# Patient Record
Sex: FEMALE | Race: BLACK OR AFRICAN AMERICAN | NOT HISPANIC OR LATINO | URBAN - METROPOLITAN AREA
[De-identification: names, ages, dates, MRNs, and addresses within clinical notes are randomized per-mention and may not be internally consistent; named-entity substitution may affect disease eponyms.]

---

## 2023-10-16 ENCOUNTER — EMERGENCY (EMERGENCY)
Facility: HOSPITAL | Age: 38
LOS: 1 days | Discharge: HOME | End: 2023-10-16
Attending: EMERGENCY MEDICINE
Payer: SELF-PAY

## 2023-10-16 VITALS
TEMPERATURE: 98 F | SYSTOLIC BLOOD PRESSURE: 110 MMHG | HEART RATE: 76 BPM | OXYGEN SATURATION: 98 % | DIASTOLIC BLOOD PRESSURE: 70 MMHG | RESPIRATION RATE: 16 BRPM | HEIGHT: 64 IN | WEIGHT: 139.99 LBS

## 2023-10-16 LAB
FLUAV AG NPH QL: SIGNIFICANT CHANGE UP
FLUBV AG NPH QL: SIGNIFICANT CHANGE UP
SARS-COV-2 RNA SPEC QL NAA+PROBE: SIGNIFICANT CHANGE UP

## 2023-10-16 PROCEDURE — 87637 SARSCOV2&INF A&B&RSV AMP PRB: CPT

## 2023-10-16 PROCEDURE — 99284 EMERGENCY DEPT VISIT MOD MDM: CPT

## 2023-10-16 PROCEDURE — 99285 EMERGENCY DEPT VISIT HI MDM: CPT

## 2023-10-16 RX ORDER — METHADONE HYDROCHLORIDE 40 MG/1
90 TABLET ORAL ONCE
Refills: 0 | Status: DISCONTINUED | OUTPATIENT
Start: 2023-10-16 | End: 2023-10-16

## 2023-10-16 RX ORDER — METHADONE HYDROCHLORIDE 40 MG/1
40 TABLET ORAL ONCE
Refills: 0 | Status: DISCONTINUED | OUTPATIENT
Start: 2023-10-16 | End: 2023-10-16

## 2023-10-16 RX ADMIN — METHADONE HYDROCHLORIDE 40 MILLIGRAM(S): 40 TABLET ORAL at 16:02

## 2023-10-16 NOTE — ED ADULT NURSE NOTE - CADM POA URETHRAL CATHETER
Assessment completed. No complaints of pain or discomfort. Encouraged to call if needs any thing. No

## 2023-10-16 NOTE — ED PROVIDER NOTE - OBJECTIVE STATEMENT
38-year-old female history of opiate abuse on methadone per patient here under police custody.  States she has had generalized body aches since yesterday.  States her last dose of methadone was 2 days ago.  States she obtains her methadone from [facility] in [place].  Denies any vomiting, fever, chest pain or shortness of breath.  Denies any allergies.

## 2023-10-16 NOTE — ED ADULT NURSE NOTE - NSFALLUNIVINTERV_ED_ALL_ED
Bed/Stretcher in lowest position, wheels locked, appropriate side rails in place/Call bell, personal items and telephone in reach/Instruct patient to call for assistance before getting out of bed/chair/stretcher/Non-slip footwear applied when patient is off stretcher/[place] to call system/Physically safe environment - no spills, clutter or unnecessary equipment/Purposeful proactive rounding/Room/bathroom lighting operational, light cord in reach

## 2023-10-16 NOTE — ED ADULT NURSE NOTE - ED STAT RN HANDOFF DETAILS
Patient observed sleeping most the time breathing unlabored D/C as per MD order,   Rosy #[id number] at bed side, awaiting for transportation.

## 2023-10-16 NOTE — ED PROVIDER NOTE - CLINICAL SUMMARY MEDICAL DECISION MAKING FREE TEXT BOX
38-year-old female states she is on methadone currently under police custody.  States she obtains her medication from [facility] in [place].  Upon calling facility.  No answer.  Facility is closed for listing on the Internet.  Will provide minimal dose of medication and anticipate discharge afterwards.

## 2023-10-16 NOTE — ED PROVIDER NOTE - PATIENT PORTAL LINK FT
You can access the FollowMyHealth Patient Portal offered by [facility] by registering at the following website: [email or web address]. By joining [employer]’s FollowMyHealth portal, you will also be able to view your health information using other applications (apps) compatible with our system.

## 2024-02-29 ENCOUNTER — EMERGENCY (EMERGENCY)
Facility: HOSPITAL | Age: 39
LOS: 1 days | Discharge: HOME | End: 2024-02-29
Attending: EMERGENCY MEDICINE | Admitting: EMERGENCY MEDICINE
Payer: SELF-PAY

## 2024-02-29 VITALS — HEART RATE: 64 BPM | OXYGEN SATURATION: 98 %

## 2024-02-29 VITALS
TEMPERATURE: 98 F | HEART RATE: 83 BPM | RESPIRATION RATE: 18 BRPM | OXYGEN SATURATION: 100 % | DIASTOLIC BLOOD PRESSURE: 92 MMHG | SYSTOLIC BLOOD PRESSURE: 141 MMHG

## 2024-02-29 PROCEDURE — 99053 MED SERV 10PM-8AM 24 HR FAC: CPT

## 2024-02-29 PROCEDURE — 99283 EMERGENCY DEPT VISIT LOW MDM: CPT

## 2024-02-29 NOTE — ED ADULT NURSE NOTE - CHIEF COMPLAINT QUOTE
Found wandering in [employer] shop. Endorses cocaine crack uses today. Denies alcohol use. Denies discomfort. in no apparent distress. No apparent injury noted. Hx of asthma opiate abuse    Pt undressed and placed in gown. Belonging across [facility]

## 2024-02-29 NOTE — ED PROVIDER NOTE - OBJECTIVE STATEMENT
38-year-old female history of polysubstance use presents to the emergency department after being found wandering at [employer].  patient admits to using heroin and smoking crack cocaine and taking an unknown pill.  Patient denies alcohol use.  Patient unsure of why or how she is in the emergency department.  Patient A&Ox3. No current complaints.

## 2024-02-29 NOTE — ED PROVIDER NOTE - NSFOLLOWUPINSTRUCTIONS_ED_ALL_ED_FT
Return to the ER for worsening or persistent symptoms, including but not limited to worsening/persistent pain, shortness of breath, fevers, vomiting, lightheadedness, passing out and/or ANY NEW OR CONCERNING SYMPTOMS. If you have issues obtaining follow up, please call: [phone] to obtain a doctor or specialist who takes your insurance in your area.  You may call [phone] to make an appointment with the internal medicine clinic.

## 2024-02-29 NOTE — ED ADULT NURSE NOTE - NSFALLRISKASMTTYPE_ED_ALL_ED
Quality 110: Preventive Care And Screening: Influenza Immunization: Influenza immunization was not ordered or administered, reason not given Detail Level: Detailed Quality 111:Pneumonia Vaccination Status For Older Adults: Pneumococcal vaccine (PPSV23) administered on or after patient’s 60th birthday and before the end of the measurement period Quality 431: Preventive Care And Screening: Unhealthy Alcohol Use - Screening: Patient not identified as an unhealthy alcohol user when screened for unhealthy alcohol use using a systematic screening method Initial (On Arrival) Quality 226: Preventive Care And Screening: Tobacco Use: Screening And Cessation Intervention: Patient screened for tobacco use and is an ex/non-smoker

## 2024-02-29 NOTE — ED ADULT TRIAGE NOTE - CHIEF COMPLAINT QUOTE
Found wandering in [employer] shop. Endorses cocaine crack uses today. Denies alcohol use. Denies discomfort. Denies discomfort at present. Hx of asthma opiate abuse Found wandering in [employer] shop. Endorses cocaine crack uses today. Denies alcohol use. Denies discomfort. in no apparent distress. Hx of asthma opiate abuse Found wandering in [employer] shop. Endorses cocaine crack uses today. Denies alcohol use. Denies discomfort. in no apparent distress. No apparent injury noted. Hx of asthma opiate abuse    Pt undressed and placed in gown. Belonging across [facility]

## 2024-02-29 NOTE — ED PROVIDER NOTE - CPE EDP EYES NORM
Chronic ongoing problem, discussed with patient about smoking cessation, counseled regarding this.  He is agreeable to start medication.  Prescribed varenicline generic prescription.  Discussed Chantix is off market now   normal...

## 2024-02-29 NOTE — ED PROVIDER NOTE - ATTENDING APP SHARED VISIT CONTRIBUTION OF CARE
HPI: 38-year-old female history of polysubstance use presents to the emergency department after being found wandering at [employer] [facility].  patient admits to using heroin and smoking crack cocaine and taking an unknown pill.  Patient denies alcohol use.  Patient unsure of why or how she is in the emergency department.  Patient A&Ox3. No current complaints.    EXAM: Awake, alert, NAD, heart RRR, lungs ctab, abd soft nontender, MSK exam normal     MDM: pt with polysubstance abuse that was brought in by EMS from [employer] for wandering. Has no complaints, VS WNL. Will monitor for sobriety and likely discharge home in AM. Pt FS wnl. Will place on pulse ox and if decompensates low threshold to obtain labs and provide narcan and monitor.

## 2024-02-29 NOTE — ED ADULT NURSE NOTE - CAS EDP DISCH TYPE
"Patient reports she has had intermittent abdominal pain since the beginning of November.  Left side, lower rib cage to just under ribs.  She noticed pain when she ate bread and brownies with almond flour - she is trying a new diet.  Denies all other symptoms other than occasional nausea.  States her pain seems to subside when she eats.  She thought by now the pain would have resolved.  Pain has been the same although yesterday was worse than previous days.    Today pain is like other days.  She prefers to see IM MD only, because of her hx of cancer.  Next available scheduled for 11-24-20.  RN advised s/s that would warrant ER visit.  Patient agrees with plan and verbalized understanding.    Selam COUCH MSN, RN        Additional Information    Negative: Passed out (i.e., fainted, collapsed and was not responding)    Negative: Shock suspected (e.g., cold/pale/clammy skin, too weak to stand, low BP, rapid pulse)    Negative: Visible sweat on face or sweat is dripping down    Negative: Chest pain    Answer Assessment - Initial Assessment Questions  1. LOCATION: \"Where does it hurt?\"       Left part of stomach, at lower rib cage and right below.  2. RADIATION: \"Does the pain shoot anywhere else?\" (e.g., chest, back)      No radiating pain.  3. ONSET: \"When did the pain begin?\" (e.g., minutes, hours or days ago)       Few weeks ago it started.  Made almond flour bread and brownies which then her stomach went \"wild\" after eating that and hasn't felt good since.  4. SUDDEN: \"Gradual or sudden onset?\"      gradual  5. PATTERN \"Does the pain come and go, or is it constant?\"     - If constant: \"Is it getting better, staying the same, or worsening?\"       (Note: Constant means the pain never goes away completely; most serious pain is constant and it progresses)      - If intermittent: \"How long does it last?\" \"Do you have pain now?\"      (Note: Intermittent means the pain goes away completely between bouts)      Intermittent " "pain, sometimes it can be all day and sometimes it can be for short term.  6. SEVERITY: \"How bad is the pain?\"  (e.g., Scale 1-10; mild, moderate, or severe)     - MILD (1-3): doesn't interfere with normal activities, abdomen soft and not tender to touch      - MODERATE (4-7): interferes with normal activities or awakens from sleep, tender to touch      - SEVERE (8-10): excruciating pain, doubled over, unable to do any normal activities        Mild-moderate  7. RECURRENT SYMPTOM: \"Have you ever had this type of abdominal pain before?\" If so, ask: \"When was the last time?\" and \"What happened that time?\"       Back in 2014, controlled by gastric enzymes  8. AGGRAVATING FACTORS: \"Does anything seem to cause this pain?\" (e.g., foods, stress, alcohol)      Eating makes it better.  9. CARDIAC SYMPTOMS: \"Do you have any of the following symptoms: chest pain, difficulty breathing, sweating, nausea?\"      denies  10. OTHER SYMPTOMS: \"Do you have any other symptoms?\" (e.g., fever, vomiting, diarrhea)        Some occasional nausea.  11. PREGNANCY: \"Is there any chance you are pregnant?\" \"When was your last menstrual period?\"        No    Protocols used: ABDOMINAL PAIN - UPPER-A-OH    " Home

## 2024-02-29 NOTE — ED PROVIDER NOTE - PROGRESS NOTE DETAILS
Pt well appearing. A&Ox3. ambulatory without assistance. SW to assist with transport home for patient. VSS

## 2024-02-29 NOTE — ED ADULT TRIAGE NOTE - MEANS OF ARRIVAL
Initiate Treatment: Prednisone 10 mg  1 tab po qd Continue Regimen: Humira Discontinue Regimen: Topical clobetasol Detail Level: Detailed Render In Strict Bullet Format?: No Otc Regimen: Microcyn ambulatory

## 2024-02-29 NOTE — ED PROVIDER NOTE - CLINICAL SUMMARY MEDICAL DECISION MAKING FREE TEXT BOX
38-year-old female history of polysubstance use presents to the emergency department after being found wandering at [employer].  patient admits to using heroin and smoking crack cocaine and taking an unknown pill.  Patient denies alcohol use.  Patient unsure of why or how she is in the emergency department.  Patient A&Ox3. No current complaints.     plan  - monitor

## 2024-02-29 NOTE — ED ADULT NURSE REASSESSMENT NOTE - NS ED NURSE REASSESS COMMENT FT1
Pt noted to be in no acute distress. Continuous pulse ox maintained. RR even and unlabored. Pending discharge planning.

## 2024-02-29 NOTE — ED ADULT NURSE NOTE - OBJECTIVE STATEMENT
Break RN note- Break RN note- Patient arrives to the ED with EMS after found wandering in [place] [employer] [place]. Patient reports cocaine crack use today but denies alcohol use. Denies chest pain, dizziness, headache, or other sx. Patient calm and cooperative. Patient breathing even and nonlabored. No acute distress. Patient appears comfortable. Safety maintained. Patient stable upon exiting the room. Report given to primary RN.

## 2024-02-29 NOTE — ED PROVIDER NOTE - PATIENT PORTAL LINK FT
You can access the FollowMyHealth Patient Portal offered by [facility] by registering at the following website: [email or web address]. By joining [employer]’s FollowMyHealth portal, you will also be able to view your health information using other applications (apps) compatible with our system.

## 2024-02-29 NOTE — ED ADULT NURSE NOTE - NSFALLUNIVINTERV_ED_ALL_ED
Bed/Stretcher in lowest position, wheels locked, appropriate side rails in place/Call bell, personal items and telephone in reach/Instruct patient to call for assistance before getting out of bed/chair/stretcher/Non-slip footwear applied when patient is off stretcher/[place] to call system/Physically safe environment - no spills, clutter or unnecessary equipment/Purposeful proactive rounding/Room/bathroom lighting operational, light cord in reach

## 2024-03-18 NOTE — ED ADULT TRIAGE NOTE - PATIENT ON (OXYGEN DELIVERY METHOD)
H&P reviewed. The patient was examined and there are no changes to the H&P.  Plan laparoscopic cholecystectomy.  Outpatient planned.  Perioperative course and expectations and potential risk complications although small reviewed and agreed.  Postoperative diet and activity and medications reviewed.  Full consent reobtained.  All questions answered.  Patient and  acknowledge understanding and agreement with the above assessment and plan   room air